# Patient Record
Sex: MALE | Race: OTHER | HISPANIC OR LATINO | URBAN - METROPOLITAN AREA
[De-identification: names, ages, dates, MRNs, and addresses within clinical notes are randomized per-mention and may not be internally consistent; named-entity substitution may affect disease eponyms.]

---

## 2023-04-24 ENCOUNTER — EMERGENCY (EMERGENCY)
Facility: HOSPITAL | Age: 38
LOS: 1 days | Discharge: HOME | End: 2023-04-24
Attending: EMERGENCY MEDICINE
Payer: COMMERCIAL

## 2023-04-24 VITALS
RESPIRATION RATE: 16 BRPM | HEART RATE: 62 BPM | TEMPERATURE: 98 F | HEIGHT: 72 IN | OXYGEN SATURATION: 99 % | SYSTOLIC BLOOD PRESSURE: 134 MMHG | WEIGHT: 205.03 LBS | DIASTOLIC BLOOD PRESSURE: 77 MMHG

## 2023-04-24 PROCEDURE — 99283 EMERGENCY DEPT VISIT LOW MDM: CPT

## 2023-04-24 PROCEDURE — 99284 EMERGENCY DEPT VISIT MOD MDM: CPT

## 2023-04-24 RX ORDER — IBUPROFEN 200 MG
1 TABLET ORAL
Qty: 30 | Refills: 0
Start: 2023-04-24

## 2023-04-24 RX ORDER — IBUPROFEN 200 MG
800 TABLET ORAL ONCE
Refills: 0 | Status: COMPLETED | OUTPATIENT
Start: 2023-04-24 | End: 2023-04-24

## 2023-04-24 RX ADMIN — Medication 800 MILLIGRAM(S): at 09:51
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
## 2023-04-24 NOTE — ED PROVIDER NOTE - NSFOLLOWUPINSTRUCTIONS_ED_ALL_ED_FT
Distensión de la austyn    CUIDADO AMBULATORIO:    Shayne distensión de la ingleocurre cuando un músculo o tendón está estirado o desgarrado. Los tendones son cordones de tejido que sujetan el músculo al hueso.    Los síntomas más comunes incluyen los siguientes:    Dolor y sensibilidad en la parte de adentro de bird muslo.    Dolor cuando usted une michelle piernas.    Dolor cuando usted levanta bird rodilla.  Llame a bird médico si:    Usted tiene más inflamación y dolor en el área lesionada.    Usted tiene más dolor de austyn al estar parado o con movimiento.    Usted tiene preguntas o inquietudes acerca de bird lesión o tratamiento.  El tratamiento para shayne distensión de la inglepodría incluir medicamentos para el dolor. Es probable que usted también necesite de un dispositivo de apoyo, ellyn muletas o un bastón, para disminuir el dolor mientras se mueve.    Cuidado de bird distensión de la austyn:    El descanso ayuda a disminuir el riesgo de más daños en la austyn.Evite actividades que causen dolor. Use las muletas o un bastón ellyn le indiquen.    Aplique hielo para ayudar a disminuir la inflamación y el dolor.Use shayne compresa de hielo o ponga hielo triturado en shayne bolsa de plástico. Cúbralo con shayne toalla antes de colocarlo sobre la austyn. Aplique hielo peri 15 a 20 minutos por hora o según indicaciones.    Cubra la austyn:Bird médico le indicará cómo envolverse la austyn con shayne venda o cinta elástica. Bird austyn se vuelve más estable cuando usted la envuelve. Envolver la austyn puede ayudar a bajar el dolor.  Cómo colocar shayne venda elástica      Eleve la pierna del lado lesionado con la frecuencia posible.Iona ayudará a disminuir la inflamación y el dolor en bird austyn. Coloque bird pierna sobre almohadas o cobijas para mantenerla elevada cómodamente.  Elevar la pierna  Actividad:Es posible que usted tenga que ejercitar el área lesionada después de que bird dolor e inflamación hayan disminuido. El ejercicio va a ayudar a prevenir rigidez en bird área lesionada y aumentar bird fuerza. Regrese a michelle actividades normales lentamente. Usted podría lesionarse otra vez si regresa a michelle actividades normales demasiado pronto. Regrese a bird nivel normal de actividad cuando:    Usted no tiene dolor cuando camina, corre o salta.    La pierna lesionada se mueve igual que la pierna no lesionada.    La pierna lesionada se siente tan natacha ellyn la pierna no lesionada.  Prevenga otra distensión de la austyn:    Entre en calor y estírese antes de hacer ejercicio.    Use zapatos que le calcen noni.    Use equipo de protección cuando practique deportes.    Christiano los ejercicios para aumentar la fuerza muscular karthikeyan ellyn se le recomienda.  Acuda a la consulta de control con bird médico según las indicaciones:Anote michelle preguntas para que se acuerde de hacerlas peri michelle visitas.

## 2023-04-24 NOTE — ED PROVIDER NOTE - CLINICAL SUMMARY MEDICAL DECISION MAKING FREE TEXT BOX
Patient with right groin strain.  Clinical exam reveals that pain is reproduced with right leg movement.  Low suspicion for blood clot or infection.  Will advise ibuprofen and PCP follow-up if symptoms persist for possible referral to PT.  No need for imaging at this time no need for lab work at this time.  Low suspicion for testicular abnormality given that testicles are nontender low suspicion for renal colic given that the localized nature of the pain.

## 2023-04-24 NOTE — ED PROVIDER NOTE - PATIENT PORTAL LINK FT
You can access the FollowMyHealth Patient Portal offered by [facility] by registering at the following website: [email or web address]. By joining [employer]’s FollowMyHealth portal, you will also be able to view your health information using other applications (apps) compatible with our system.

## 2023-04-24 NOTE — ED ADULT NURSE NOTE - NSIMPLEMENTINTERV_GEN_ALL_ED
Implemented All Universal Safety Interventions:  [place] to call system. Call bell, personal items and telephone within reach. Instruct patient to call for assistance. Room bathroom lighting operational. Non-slip footwear when patient is off stretcher. Physically safe environment: no spills, clutter or unnecessary equipment. Stretcher in lowest position, wheels locked, appropriate side rails in place.

## 2023-12-11 ENCOUNTER — APPOINTMENT (OUTPATIENT)
Dept: ORTHOPEDIC SURGERY | Facility: CLINIC | Age: 38
End: 2023-12-11
Payer: COMMERCIAL

## 2023-12-11 VITALS — BODY MASS INDEX: 29.12 KG/M2 | HEIGHT: 72 IN | WEIGHT: 215 LBS

## 2023-12-11 DIAGNOSIS — Z78.9 OTHER SPECIFIED HEALTH STATUS: ICD-10-CM

## 2023-12-11 DIAGNOSIS — S62.639A DISPLACED FRACTURE OF DISTAL PHALANX OF UNSPECIFIED FINGER, INITIAL ENCOUNTER FOR CLOSED FRACTURE: ICD-10-CM

## 2023-12-11 PROBLEM — Z00.00 ENCOUNTER FOR PREVENTIVE HEALTH EXAMINATION: Status: ACTIVE | Noted: 2023-12-11

## 2023-12-11 PROCEDURE — 99203 OFFICE O/P NEW LOW 30 MIN: CPT

## 2023-12-11 PROCEDURE — 73140 X-RAY EXAM OF FINGER(S): CPT | Mod: RT

## 2023-12-13 PROBLEM — S62.639A CLOSED FRACTURE OF TUFT OF DISTAL PHALANX OF FINGER: Status: ACTIVE | Noted: 2023-12-13

## 2023-12-13 NOTE — IMAGING
[de-identified] : Right small finger Completely avulsed nail plate, Chromic Gut sutures in place Moderately swollen distal phalanx, TTP + FDS/FDP  3 views right small finger: Acute tuft fracture

## 2023-12-13 NOTE — DISCUSSION/SUMMARY
[de-identified] : Reviewed the nature of this injury with the patient in layman's terms Discussed potential treatment options including both operative and nonoperative Reviewed wound care procedures in depth Stack splint for comfort Complete oral antibiotics Follow-up in 1 week for repeat clinical check, sooner if he has any issues

## 2023-12-13 NOTE — HISTORY OF PRESENT ILLNESS
[de-identified] : 12/11/2023 NICOLE JORDAN is here for Location: Right Small Finger  Complaint: The patient reports a crush injury to his right small finger while at work on 12/6/2023.  He notes that a panel of concrete sheet rock fell and landed on his right small fingertip.  He noted immediate pain and was seen in the ER.  He was given oral antibiotics and placed in a splint with instructions to follow-up as an outpatient.  Today patient continues to note moderate pain. Onset: 12/06/2023 Prior treatments: Splint/wound care Hand dominance: Right Occupation:

## 2023-12-13 NOTE — REASON FOR VISIT
[FreeTextEntry2] : new patient: Right Small Finger  [Interpreters_IDNumber] : [id number] [Interpreters_FullName] : Rafael  [TWNoteComboBox1] : [place]

## 2023-12-18 ENCOUNTER — APPOINTMENT (OUTPATIENT)
Dept: ORTHOPEDIC SURGERY | Facility: CLINIC | Age: 38
End: 2023-12-18

## 2024-02-16 ENCOUNTER — EMERGENCY (EMERGENCY)
Facility: HOSPITAL | Age: 39
LOS: 1 days | Discharge: HOME | End: 2024-02-16
Attending: EMERGENCY MEDICINE
Payer: COMMERCIAL

## 2024-02-16 VITALS
SYSTOLIC BLOOD PRESSURE: 133 MMHG | OXYGEN SATURATION: 95 % | DIASTOLIC BLOOD PRESSURE: 86 MMHG | TEMPERATURE: 98 F | HEART RATE: 71 BPM | RESPIRATION RATE: 18 BRPM

## 2024-02-16 VITALS
SYSTOLIC BLOOD PRESSURE: 148 MMHG | HEART RATE: 102 BPM | RESPIRATION RATE: 18 BRPM | OXYGEN SATURATION: 99 % | DIASTOLIC BLOOD PRESSURE: 79 MMHG | TEMPERATURE: 98 F | WEIGHT: 220.02 LBS

## 2024-02-16 LAB
ANION GAP SERPL CALC-SCNC: 4 MMOL/L — LOW (ref 5–17)
BASOPHILS # BLD AUTO: 0.04 K/UL — SIGNIFICANT CHANGE UP (ref 0–0.2)
BASOPHILS NFR BLD AUTO: 0.6 % — SIGNIFICANT CHANGE UP (ref 0–2)
BUN SERPL-MCNC: 14 MG/DL — SIGNIFICANT CHANGE UP (ref 7–18)
CALCIUM SERPL-MCNC: 9.5 MG/DL — SIGNIFICANT CHANGE UP (ref 8.4–10.5)
CHLORIDE SERPL-SCNC: 106 MMOL/L — SIGNIFICANT CHANGE UP (ref 96–108)
CO2 SERPL-SCNC: 29 MMOL/L — SIGNIFICANT CHANGE UP (ref 22–31)
CREAT SERPL-MCNC: 0.81 MG/DL — SIGNIFICANT CHANGE UP (ref 0.5–1.3)
EGFR: 116 ML/MIN/1.73M2 — SIGNIFICANT CHANGE UP
EOSINOPHIL # BLD AUTO: 0.21 K/UL — SIGNIFICANT CHANGE UP (ref 0–0.5)
EOSINOPHIL NFR BLD AUTO: 3.1 % — SIGNIFICANT CHANGE UP (ref 0–6)
GLUCOSE SERPL-MCNC: 106 MG/DL — HIGH (ref 70–99)
HCT VFR BLD CALC: 45.5 % — SIGNIFICANT CHANGE UP (ref 39–50)
HGB BLD-MCNC: 15.2 G/DL — SIGNIFICANT CHANGE UP (ref 13–17)
IMM GRANULOCYTES NFR BLD AUTO: 0.1 % — SIGNIFICANT CHANGE UP (ref 0–0.9)
LACTATE SERPL-SCNC: 0.8 MMOL/L — SIGNIFICANT CHANGE UP (ref 0.7–2)
LYMPHOCYTES # BLD AUTO: 2.49 K/UL — SIGNIFICANT CHANGE UP (ref 1–3.3)
LYMPHOCYTES # BLD AUTO: 36.4 % — SIGNIFICANT CHANGE UP (ref 13–44)
MCHC RBC-ENTMCNC: 28.8 PG — SIGNIFICANT CHANGE UP (ref 27–34)
MCHC RBC-ENTMCNC: 33.4 GM/DL — SIGNIFICANT CHANGE UP (ref 32–36)
MCV RBC AUTO: 86.2 FL — SIGNIFICANT CHANGE UP (ref 80–100)
MONOCYTES # BLD AUTO: 0.57 K/UL — SIGNIFICANT CHANGE UP (ref 0–0.9)
MONOCYTES NFR BLD AUTO: 8.3 % — SIGNIFICANT CHANGE UP (ref 2–14)
NEUTROPHILS # BLD AUTO: 3.52 K/UL — SIGNIFICANT CHANGE UP (ref 1.8–7.4)
NEUTROPHILS NFR BLD AUTO: 51.5 % — SIGNIFICANT CHANGE UP (ref 43–77)
NRBC # BLD: 0 /100 WBCS — SIGNIFICANT CHANGE UP (ref 0–0)
PLATELET # BLD AUTO: 324 K/UL — SIGNIFICANT CHANGE UP (ref 150–400)
POTASSIUM SERPL-MCNC: 3.7 MMOL/L — SIGNIFICANT CHANGE UP (ref 3.5–5.3)
POTASSIUM SERPL-SCNC: 3.7 MMOL/L — SIGNIFICANT CHANGE UP (ref 3.5–5.3)
RBC # BLD: 5.28 M/UL — SIGNIFICANT CHANGE UP (ref 4.2–5.8)
RBC # FLD: 12.6 % — SIGNIFICANT CHANGE UP (ref 10.3–14.5)
SODIUM SERPL-SCNC: 139 MMOL/L — SIGNIFICANT CHANGE UP (ref 135–145)
WBC # BLD: 6.84 K/UL — SIGNIFICANT CHANGE UP (ref 3.8–10.5)
WBC # FLD AUTO: 6.84 K/UL — SIGNIFICANT CHANGE UP (ref 3.8–10.5)

## 2024-02-16 PROCEDURE — 85025 COMPLETE CBC W/AUTO DIFF WBC: CPT

## 2024-02-16 PROCEDURE — 99284 EMERGENCY DEPT VISIT MOD MDM: CPT | Mod: 25

## 2024-02-16 PROCEDURE — 99285 EMERGENCY DEPT VISIT HI MDM: CPT

## 2024-02-16 PROCEDURE — 96374 THER/PROPH/DIAG INJ IV PUSH: CPT

## 2024-02-16 PROCEDURE — 80048 BASIC METABOLIC PNL TOTAL CA: CPT

## 2024-02-16 PROCEDURE — 36415 COLL VENOUS BLD VENIPUNCTURE: CPT

## 2024-02-16 PROCEDURE — 83605 ASSAY OF LACTIC ACID: CPT

## 2024-02-16 RX ORDER — MORPHINE SULFATE 50 MG/1
4 CAPSULE, EXTENDED RELEASE ORAL ONCE
Refills: 0 | Status: DISCONTINUED | OUTPATIENT
Start: 2024-02-16 | End: 2024-02-16

## 2024-02-16 RX ADMIN — MORPHINE SULFATE 4 MILLIGRAM(S): 50 CAPSULE, EXTENDED RELEASE ORAL at 04:34

## 2024-02-16 RX ADMIN — MORPHINE SULFATE 4 MILLIGRAM(S): 50 CAPSULE, EXTENDED RELEASE ORAL at 05:00

## 2024-02-16 NOTE — ED PROVIDER NOTE - OBJECTIVE STATEMENT
38-year-old  male with history of left-sided inguinal hernia repair presents with 4 years ago presents with acute onset of severe pain at his left groin.  Patient states it was a 10 out of 10 now is an 8 out of 10.

## 2024-02-16 NOTE — ED ADULT NURSE NOTE - NSFALLUNIVINTERV_ED_ALL_ED
Bed/Stretcher in lowest position, wheels locked, appropriate side rails in place/Call bell, personal items and telephone in reach/Instruct patient to call for assistance before getting out of bed/chair/stretcher/Non-slip footwear applied when patient is off stretcher/[place] to call system/Physically safe environment - no spills, clutter or unnecessary equipment/Purposeful proactive rounding/Room/bathroom lighting operational, light cord in reach

## 2024-02-16 NOTE — CONSULT NOTE ADULT - SUBJECTIVE AND OBJECTIVE BOX
38 Y M presenting to ED with intermittent left groin pain that started on Wednesday. Pt. has a significant PSH of left inguinal hernia repair 4 years prior and states that he feels a bulge there sometimes. Denies any nausea or vomiting. Having normal bowel function. Tolerating diet. Denies any fevers at home.     Vital Signs Last 24 Hrs  T(C): 36.9 (16 Feb 2024 06:30), Max: 36.9 (16 Feb 2024 06:30)  T(F): 98.4 (16 Feb 2024 06:30), Max: 98.4 (16 Feb 2024 06:30)  HR: 71 (16 Feb 2024 06:30) (71 - 102)  BP: 133/86 (16 Feb 2024 06:30) (133/86 - 148/79)  BP(mean): --  RR: 18 (16 Feb 2024 06:30) (18 - 18)  SpO2: 95% (16 Feb 2024 06:30) (95% - 99%)    Parameters below as of 16 Feb 2024 06:30  Patient On (Oxygen Delivery Method): room air  no acute distress, AAOX3   non labored breathing, saturating well on room air  abd is soft, non distended, No inguinal bulge noted, no overlying skin changes, mild TTP in left  inguinal canal, able to feel a protrusion of hernia contents on vasalva. Reducible.   full ROM x 4  skin well perfused                          15.2   6.84  )-----------( 324      ( 16 Feb 2024 04:40 )             45.5   02-16    139  |  106  |  14  ----------------------------<  106<H>  3.7   |  29  |  0.81    Ca    9.5      16 Feb 2024 04:40

## 2024-02-16 NOTE — ED PROVIDER NOTE - PHYSICAL EXAMINATION
General: Well appearing, no acute distress, appears stated age  HEENT: normocephalic, atraumatic   Respiratory: normal work of breathing  MSK: no swelling or tenderness of lower extremities, moving all extremities spontaneously   Skin: warm, dry  Neuro: A&Ox3, cranial nerves II-XII intact, 5/5 strength in all extremities, no sensory deficits, normal gait   Psych: appropriate affect  .: mild swelling mild tenderness no erythema

## 2024-02-16 NOTE — CONSULT NOTE ADULT - ASSESSMENT
38 Y M with non-incarcerated left inguinal hernia recurrence, WBC normal, lactate normal, having bowel function, and tolerating diet  -no acute surgical intervention   -f/u outpatient for repair of recurrent left inguinal hernia

## 2024-02-16 NOTE — ED PROVIDER NOTE - CLINICAL SUMMARY MEDICAL DECISION MAKING FREE TEXT BOX
Patient does not appear to be incarcerated or strangulated, surgery resident evaluated the patient as well and feels the same.  Patient is stable for discharge follow-up with his surgeon.

## 2024-02-16 NOTE — CONSULT NOTE ADULT - CONSULT REQUESTED BY NAME
ED Subjective:       Patient ID: Aguila Claros is a 56 y.o. male.    Chief Complaint: Establish Care (DR BLANC PT: EST CARE &F/U FOR HTN)    56-year-old gentleman is here for follow-up on his chronic hypertension and he does have a few other medical illnesses to include instruct her sleep apnea which recently has been treated, fatty liver for which she's been working out and losing weight as well as dyslipidemia with a positive family history for coronary artery disease. His last lab work was in April 2017. He is also complaining today about acute exacerbation of allergic rhinitis with sudden onset of an inability to breathe out of his nose with lots of mucus, itching and sneezing. He has tried everything over-the-counter to include Allegra, Zyrtec and Claritin to no avail. He does not really want to take something every day. He is out so interested in allergy testing. He was on Singulair one time in the remote past and this seemed to help and he is okay with taking this every evening. He states Flonase does not work either.  He'll subtle skin lesion he would like to have looked at by dermatologist on the back of his neck.  We did review his [facility] risk in view of his untreated high cholesterol and he sat 20% of the risk of a cardiovascular event within 10 years.      Review of Systems   Constitutional: Negative for activity change, diaphoresis, fatigue and fever.   HENT: Negative for congestion, ear pain, postnasal drip, sinus pressure, sore throat and trouble swallowing.    Eyes: Negative for pain.   Respiratory: Negative for cough, chest tightness, shortness of breath and wheezing.    Cardiovascular: Negative for chest pain, palpitations and leg swelling.   Gastrointestinal: Negative for abdominal distention, abdominal pain, blood in stool, constipation and diarrhea.   Endocrine: Negative for cold intolerance and heat intolerance.   Genitourinary: Negative for decreased urine volume, difficulty urinating,  dysuria, flank pain, frequency and hematuria.   Musculoskeletal: Negative for arthralgias, back pain, joint swelling, myalgias and neck pain.   Skin: Negative for pallor, rash and wound.   Neurological: Negative for tremors, syncope, weakness, light-headedness, numbness and headaches.   Hematological: Negative for adenopathy.   Psychiatric/Behavioral: Negative for confusion, decreased concentration, hallucinations, self-injury, sleep disturbance and suicidal ideas. The patient is not nervous/anxious.        History reviewed. No pertinent past medical history.    History reviewed. No pertinent surgical history.    History reviewed. No pertinent family history.    Social History     Social History    Marital status:      Spouse name: N/A    Number of children: N/A    Years of education: N/A     Social History Main Topics    Smoking status: Former Smoker     Packs/day: 0.75     Types: Cigarettes    Smokeless tobacco: Never Used      Comment: QUIT 3 YRS AGO    Alcohol use None      Comment: OCCASIONAL    Drug use: No    Sexual activity: Yes     Other Topics Concern    None     Social History Narrative    None       Current Outpatient Prescriptions   Medication Sig Dispense Refill    losartan-hydrochlorothiazide 100-12.5 mg (HYZAAR) 100-12.5 mg Tab Take 1 tablet by mouth once daily. 90 tablet 3    azelastine (ASTELIN) 137 mcg (0.1 %) nasal spray 1 spray (137 mcg total) by Nasal route 2 (two) times daily. 30 mL 3    levocetirizine (XYZAL) 5 MG tablet Take 1 tablet (5 mg total) by mouth daily as needed for Allergies. 30 tablet 11    montelukast (SINGULAIR) 10 mg tablet Take 1 tablet (10 mg total) by mouth every evening. 30 tablet 0     No current facility-administered medications for this visit.        Review of patient's allergies indicates:  No Known Allergies  Objective:      Physical Exam   Constitutional: He is oriented to person, place, and time. He appears well-developed and well-nourished. No  distress.   HENT:   Head: Normocephalic and atraumatic.   Right Ear: External ear normal.   Left Ear: External ear normal.   Nose: Mucosal edema and septal deviation present.   Mouth/Throat: Oropharynx is clear and moist.   Eyes: Conjunctivae and EOM are normal. Right eye exhibits no discharge. Left eye exhibits no discharge. No scleral icterus.   Neck: Normal range of motion. Neck supple. No JVD present. No tracheal deviation present. Thyromegaly present.   Cardiovascular: Normal rate, regular rhythm, normal heart sounds and intact distal pulses.  Exam reveals no gallop.    No murmur heard.  Pulmonary/Chest: Effort normal and breath sounds normal. No respiratory distress. He has no wheezes. He has no rales.   Abdominal: Soft. Bowel sounds are normal. He exhibits no distension and no mass. There is no tenderness.   Musculoskeletal: Normal range of motion. He exhibits no edema or tenderness.   Lymphadenopathy:     He has no cervical adenopathy.   Neurological: He is alert and oriented to person, place, and time.   Skin: Skin is warm and dry. No rash noted. He is not diaphoretic. No erythema.   Psychiatric: He has a normal mood and affect. His behavior is normal. Judgment and thought content normal.       Assessment:       1. Allergic rhinoconjunctivitis    2. Familial hypercholesterolemia    3. Benign hypertension    4. Essential hypertension    5. Enlarged thyroid    6. Skin lesion of neck    7. Screening for malignant neoplasm of prostate        Plan:       Allergic rhinoconjunctivitis  -     CBC auto differential; Future; Expected date: 04/09/2018  -     Ambulatory referral to Allergy   -     montelukast (SINGULAIR) 10 mg tablet; Take 1 tablet (10 mg total) by mouth every evening.  Dispense: 30 tablet; Refill: 0  -     levocetirizine (XYZAL) 5 MG tablet; Take 1 tablet (5 mg total) by mouth daily as needed for Allergies.  Dispense: 30 tablet; Refill: 11  -     azelastine (ASTELIN) 137 mcg (0.1 %) nasal spray; 1  spray (137 mcg total) by Nasal route 2 (two) times daily.  Dispense: 30 mL; Refill: 3    Familial hypercholesterolemia  -     Lipid panel; Future; Expected date: 04/09/2018    Benign hypertension  -     losartan-hydrochlorothiazide 100-12.5 mg (HYZAAR) 100-12.5 mg Tab; Take 1 tablet by mouth once daily.  Dispense: 90 tablet; Refill: 3  -     TSH; Future; Expected date: 04/09/2018  -     Comprehensive metabolic panel; Future; Expected date: 04/09/2018  -     T4, free; Future; Expected date: 04/09/2018  -     Microalbumin/creatinine urine ratio; Future; Expected date: 04/09/2018  -     Urinalysis Microscopic; Future; Expected date: 04/09/2018    Enlarged thyroid  -     US Soft Tissue Head Neck Thyroid; Future; Expected date: 01/29/2018    Skin lesion of neck  -     Ambulatory referral to Dermatology    Screening for malignant neoplasm of prostate  -     PSA, Screening; Future; Expected date: 04/09/2018    Other orders  Needs CRS screening as well     Time spent with the patient was 40 min and 50 percent of that was in face to face contact

## 2024-02-16 NOTE — ED PROVIDER NOTE - CARE PROVIDER_API CALL
Joy Arzola  Colon/Rectal Surgery  [address] [place] [address]  Phone: [phone]  Fax: [phone]  Follow Up Time:

## 2024-02-16 NOTE — ED PROVIDER NOTE - PATIENT PORTAL LINK FT
You can access the FollowMyHealth Patient Portal offered by [facility] by registering at the following website: [email or web address]. By joining [facility]’s FollowMyHealth portal, you will also be able to view your health information using other applications (apps) compatible with our system.

## 2024-11-12 PROBLEM — K82.4 GALLBLADDER POLYP: Status: ACTIVE | Noted: 2024-11-12

## 2024-11-14 ENCOUNTER — APPOINTMENT (OUTPATIENT)
Dept: SURGERY | Facility: CLINIC | Age: 39
End: 2024-11-14
Payer: COMMERCIAL

## 2024-11-14 VITALS
SYSTOLIC BLOOD PRESSURE: 122 MMHG | DIASTOLIC BLOOD PRESSURE: 77 MMHG | HEART RATE: 65 BPM | HEIGHT: 72 IN | WEIGHT: 213 LBS | BODY MASS INDEX: 28.85 KG/M2 | OXYGEN SATURATION: 99 %

## 2024-11-14 DIAGNOSIS — Z87.891 PERSONAL HISTORY OF NICOTINE DEPENDENCE: ICD-10-CM

## 2024-11-14 DIAGNOSIS — K82.4 CHOLESTEROLOSIS OF GALLBLADDER: ICD-10-CM

## 2024-11-14 DIAGNOSIS — Z78.9 OTHER SPECIFIED HEALTH STATUS: ICD-10-CM

## 2024-11-14 PROCEDURE — 99204 OFFICE O/P NEW MOD 45 MIN: CPT

## 2024-11-21 ENCOUNTER — OUTPATIENT (OUTPATIENT)
Dept: OUTPATIENT SERVICES | Facility: HOSPITAL | Age: 39
LOS: 1 days | End: 2024-11-21
Payer: COMMERCIAL

## 2024-11-21 VITALS
HEART RATE: 59 BPM | WEIGHT: 212.97 LBS | TEMPERATURE: 99 F | OXYGEN SATURATION: 100 % | HEIGHT: 72 IN | DIASTOLIC BLOOD PRESSURE: 65 MMHG | RESPIRATION RATE: 18 BRPM | SYSTOLIC BLOOD PRESSURE: 102 MMHG

## 2024-11-21 DIAGNOSIS — K81.9 CHOLECYSTITIS, UNSPECIFIED: ICD-10-CM

## 2024-11-21 DIAGNOSIS — Z01.818 ENCOUNTER FOR OTHER PREPROCEDURAL EXAMINATION: ICD-10-CM

## 2024-11-21 DIAGNOSIS — Z90.49 ACQUIRED ABSENCE OF OTHER SPECIFIED PARTS OF DIGESTIVE TRACT: Chronic | ICD-10-CM

## 2024-11-21 DIAGNOSIS — Z98.890 OTHER SPECIFIED POSTPROCEDURAL STATES: Chronic | ICD-10-CM

## 2024-11-21 LAB — BLD GP AB SCN SERPL QL: SIGNIFICANT CHANGE UP

## 2024-11-21 NOTE — H&P PST ADULT - ASSESSMENT
38 y/o Venezuelan-speaking male with no significant PMH is diagnosed with cholecystitis, unspecified.     STOP BANG SCORE IS 3

## 2024-11-21 NOTE — H&P PST ADULT - HISTORY OF PRESENT ILLNESS
40 y/o Estonian-speaking male presents to [facility] with preop diagnosis of cholecystitis unspecified. He complains of severe episodic right upper quadrant pain before meals ongoing for the past month. He reports that abdominal sonogram revealed a "mass in his gallbladder."  He is scheduled for laparoscopic cholecystectomy with intra-operative cholangiogram possible open on 11/29/2024 40 y/o Croatian-speaking male presents to [facility] with preop diagnosis of cholecystitis unspecified. He complains of severe episodic right upper quadrant pain before meals ongoing for the past month. He reports that abdominal sonogram revealed a "gallbladder polyps."  He is scheduled for laparoscopic cholecystectomy with intra-operative cholangiogram possible open on 11/29/2024

## 2024-11-21 NOTE — H&P PST ADULT - PROBLEM SELECTOR PLAN 1
Scheduled for laparoscopic cholecystectomy with intra-operative cholangiogram possible open on 11/29/2024  Preoperative instructions discussed and given to patient.   NPO between midnight and the time of surgery  Follow up with surgeon's and PCP's office for instructions prior to surgery   Instructed patient to avoid aspirin and aspirin products, over the counter medications such as vitamins and herbal medications one week prior to surgery.  Avoid tobacco use the morning of surgery  Take Tylenol as needed for pain  Follow up with PCP postoperatively for management of chronic conditions  Discussed preprocedure skin preparation using  dial soap and chlorhexidine gluconate 4% solution three days prior to  surgery - including the day of surgery  Patient verbalized understanding of instructions  Labs pending from PCP's office

## 2024-11-22 LAB
HCV AB S/CO SERPL IA: 0.07 S/CO — SIGNIFICANT CHANGE UP (ref 0–0.99)
HCV AB SERPL-IMP: SIGNIFICANT CHANGE UP

## 2024-11-22 PROCEDURE — T1013: CPT

## 2024-11-22 PROCEDURE — 36415 COLL VENOUS BLD VENIPUNCTURE: CPT

## 2024-11-22 PROCEDURE — 86901 BLOOD TYPING SEROLOGIC RH(D): CPT

## 2024-11-22 PROCEDURE — 86850 RBC ANTIBODY SCREEN: CPT

## 2024-11-22 PROCEDURE — 86803 HEPATITIS C AB TEST: CPT

## 2024-11-22 PROCEDURE — G0463: CPT

## 2024-11-22 PROCEDURE — 86900 BLOOD TYPING SEROLOGIC ABO: CPT

## 2024-11-29 ENCOUNTER — APPOINTMENT (OUTPATIENT)
Dept: SURGERY | Facility: HOSPITAL | Age: 39
End: 2024-11-29

## 2024-11-29 ENCOUNTER — OUTPATIENT (OUTPATIENT)
Dept: OUTPATIENT SERVICES | Facility: HOSPITAL | Age: 39
LOS: 1 days | End: 2024-11-29
Payer: COMMERCIAL

## 2024-11-29 ENCOUNTER — TRANSCRIPTION ENCOUNTER (OUTPATIENT)
Age: 39
End: 2024-11-29

## 2024-11-29 VITALS
SYSTOLIC BLOOD PRESSURE: 130 MMHG | HEIGHT: 72 IN | DIASTOLIC BLOOD PRESSURE: 65 MMHG | WEIGHT: 212.97 LBS | RESPIRATION RATE: 16 BRPM | TEMPERATURE: 98 F | OXYGEN SATURATION: 96 % | HEART RATE: 63 BPM

## 2024-11-29 VITALS
DIASTOLIC BLOOD PRESSURE: 76 MMHG | OXYGEN SATURATION: 96 % | RESPIRATION RATE: 18 BRPM | TEMPERATURE: 98 F | SYSTOLIC BLOOD PRESSURE: 126 MMHG | HEART RATE: 63 BPM

## 2024-11-29 DIAGNOSIS — Z98.890 OTHER SPECIFIED POSTPROCEDURAL STATES: Chronic | ICD-10-CM

## 2024-11-29 DIAGNOSIS — K81.9 CHOLECYSTITIS, UNSPECIFIED: ICD-10-CM

## 2024-11-29 DIAGNOSIS — Z90.49 ACQUIRED ABSENCE OF OTHER SPECIFIED PARTS OF DIGESTIVE TRACT: Chronic | ICD-10-CM

## 2024-11-29 DIAGNOSIS — Z01.818 ENCOUNTER FOR OTHER PREPROCEDURAL EXAMINATION: ICD-10-CM

## 2024-11-29 LAB — BLD GP AB SCN SERPL QL: SIGNIFICANT CHANGE UP

## 2024-11-29 PROCEDURE — 86850 RBC ANTIBODY SCREEN: CPT

## 2024-11-29 PROCEDURE — 76000 FLUOROSCOPY <1 HR PHYS/QHP: CPT

## 2024-11-29 PROCEDURE — 88304 TISSUE EXAM BY PATHOLOGIST: CPT

## 2024-11-29 PROCEDURE — 86900 BLOOD TYPING SEROLOGIC ABO: CPT

## 2024-11-29 PROCEDURE — 47563 LAPARO CHOLECYSTECTOMY/GRAPH: CPT | Mod: AS

## 2024-11-29 PROCEDURE — 47563 LAPARO CHOLECYSTECTOMY/GRAPH: CPT

## 2024-11-29 PROCEDURE — 36415 COLL VENOUS BLD VENIPUNCTURE: CPT

## 2024-11-29 PROCEDURE — 86901 BLOOD TYPING SEROLOGIC RH(D): CPT

## 2024-11-29 PROCEDURE — 88304 TISSUE EXAM BY PATHOLOGIST: CPT | Mod: 26

## 2024-11-29 RX ORDER — 0.9 % SODIUM CHLORIDE 0.9 %
1000 INTRAVENOUS SOLUTION INTRAVENOUS
Refills: 0 | Status: DISCONTINUED | OUTPATIENT
Start: 2024-11-29 | End: 2024-11-29

## 2024-11-29 RX ORDER — OXYCODONE HYDROCHLORIDE 30 MG/1
1 TABLET ORAL
Qty: 8 | Refills: 0
Start: 2024-11-29 | End: 2024-11-30

## 2024-11-29 RX ORDER — FENTANYL 12 UG/H
25 PATCH, EXTENDED RELEASE TRANSDERMAL
Refills: 0 | Status: DISCONTINUED | OUTPATIENT
Start: 2024-11-29 | End: 2024-11-29

## 2024-11-29 RX ORDER — FENTANYL 12 UG/H
50 PATCH, EXTENDED RELEASE TRANSDERMAL
Refills: 0 | Status: DISCONTINUED | OUTPATIENT
Start: 2024-11-29 | End: 2024-11-29

## 2024-11-29 RX ADMIN — FENTANYL 50 MICROGRAM(S): 12 PATCH, EXTENDED RELEASE TRANSDERMAL at 10:09

## 2024-11-29 NOTE — ASU DISCHARGE PLAN (ADULT/PEDIATRIC) - ASU DC SPECIAL INSTRUCTIONSFT
Please follow-up with your surgeon in 1 week. Drink plenty of fluids and rest as needed. Call for any fever over 101, nausea, vomiting, severe pain, no passing of gas or bowel movement.     DIET   You may resume your regular diet as normal.     SURGICAL SITES   Remove outer dressing and keep white steri-strips in place allowing them to fall off on their own. You may shower 48 hours post-operatively but do not bathe or soak in the water for 1-2 weeks; pat dry. If you notice any signs of surgical site infection (ie. redness, swelling, pain, pus drainage), please seek medical care immediately.    ACTIVITY Do not lift anything heavier than 10 pounds for 2 weeks (2-3 months for hernia, no exercise for 2 weeks) and avoid strenuous activity for 4-6 weeks.     PAIN CONTROL   You may take Motrin 600mg (with food) or Tylenol 650mg as needed for mild pain. Stagger one medication 3 hours after the other for maximum pain control. Maximum daily dose of Tylenol should not exceed 4grams/day.  You may take your prescribed oxycodone for severe breakthrough pain not that is not relieved by Tylenol/Motrin. Do not drive or make important decisions while taking this medication and do not take more than 4 pills in 24 hours. Please refer to medical records/ progress notes for in depth hospital course. Discharge plan discussed and agreed with attending.

## 2024-11-29 NOTE — ASU DISCHARGE PLAN (ADULT/PEDIATRIC) - CARE PROVIDER_API CALL
Ankit Marte  Surgery  [address]  [place] [address]  Phone: [phone]  Fax: [phone]  Follow Up Time: 2 weeks

## 2024-11-29 NOTE — ASU DISCHARGE PLAN (ADULT/PEDIATRIC) - FINANCIAL ASSISTANCE
[facility] provides services at a reduced cost to those who are determined to be eligible through [facility]’s financial assistance program. Information regarding [facility]’s financial assistance program can be found by going to [email or web address] or by calling [phone].

## 2024-12-11 LAB — SURGICAL PATHOLOGY STUDY: SIGNIFICANT CHANGE UP

## 2025-01-04 ENCOUNTER — EMERGENCY (EMERGENCY)
Facility: HOSPITAL | Age: 40
LOS: 1 days | Discharge: HOME | End: 2025-01-04
Attending: STUDENT IN AN ORGANIZED HEALTH CARE EDUCATION/TRAINING PROGRAM
Payer: COMMERCIAL

## 2025-01-04 VITALS
RESPIRATION RATE: 20 BRPM | DIASTOLIC BLOOD PRESSURE: 72 MMHG | SYSTOLIC BLOOD PRESSURE: 121 MMHG | WEIGHT: 184.97 LBS | OXYGEN SATURATION: 99 % | TEMPERATURE: 98 F | HEIGHT: 68 IN | HEART RATE: 82 BPM

## 2025-01-04 DIAGNOSIS — Z98.890 OTHER SPECIFIED POSTPROCEDURAL STATES: Chronic | ICD-10-CM

## 2025-01-04 DIAGNOSIS — Z90.49 ACQUIRED ABSENCE OF OTHER SPECIFIED PARTS OF DIGESTIVE TRACT: Chronic | ICD-10-CM

## 2025-01-04 PROCEDURE — 99283 EMERGENCY DEPT VISIT LOW MDM: CPT

## 2025-01-04 PROCEDURE — 99053 MED SERV 10PM-8AM 24 HR FAC: CPT

## 2025-01-05 VITALS
OXYGEN SATURATION: 97 % | SYSTOLIC BLOOD PRESSURE: 118 MMHG | DIASTOLIC BLOOD PRESSURE: 80 MMHG | TEMPERATURE: 98 F | HEART RATE: 77 BPM | RESPIRATION RATE: 18 BRPM

## 2025-01-05 PROCEDURE — 99285 EMERGENCY DEPT VISIT HI MDM: CPT

## 2025-01-05 PROCEDURE — T1013: CPT

## 2025-03-24 NOTE — ASU PATIENT PROFILE, ADULT - HAS THE PATIENT USED TOBACCO IN THE PAST 30 DAYS?
No care due was identified.  Health [facility] Embedded Care Due Messages. Reference number: [id number].   3/24/2025 9:55:36 AM CDT  
Please see the attached refill request.  
Yes

## 2025-05-30 NOTE — ED ADULT TRIAGE NOTE - AS TEMP SITE
[Right] : right knee [FreeTextEntry9] : 4 views (AP, Lateral, [place] and [place]) obtained and interpreted on 05/30/2025. There are no fractures, subluxations or dislocations. No significant abnormalities are seen. oral

## (undated) DEVICE — BLADE SURGICAL #10 CARBON

## (undated) DEVICE — GLV 7.5 PROTEXIS (WHITE)

## (undated) DEVICE — INSUFFLATION NDL COVIDIEN SURGINEEDLE VERESS 120MM

## (undated) DEVICE — TUBING STRYKEFLOW II SUCTION / IRRIGATOR

## (undated) DEVICE — ELCTR FOOT CONTROL L WIRE LAPAROSCOPIC

## (undated) DEVICE — BLADE SURGICAL #15 CARBON

## (undated) DEVICE — SOL INJ NS 0.9% 1000ML

## (undated) DEVICE — SUCTION YANKAUER NO CONTROL VENT

## (undated) DEVICE — GLV 7 PROTEXIS (WHITE)

## (undated) DEVICE — D HELP - CLEARVIEW CLEARIFY SYSTEM

## (undated) DEVICE — NDL HYPO REGULAR BEVEL 25G X 1.5" (BLUE)

## (undated) DEVICE — ELCTR GROUNDING PAD ADULT COVIDIEN

## (undated) DEVICE — PRECISION CUT SCISSOR MICROLINE MINI ENDOCUT DISP

## (undated) DEVICE — SUT POLYSORB 0 30" GU-46

## (undated) DEVICE — DRSG TEGADERM 2.5 X 3"

## (undated) DEVICE — WARMING BLANKET UPPER ADULT

## (undated) DEVICE — DRAPE C ARM UNIVERSAL

## (undated) DEVICE — DRSG 2X2

## (undated) DEVICE — ELCTR BOVIE BLADE 3/4" EXTENDED LENGTH 6"

## (undated) DEVICE — PACK GENERAL LAPAROSCOPY

## (undated) DEVICE — SUT POLYSORB 4-0 27" P-12 UNDYED

## (undated) DEVICE — SUT MAXON 1 60" T-60

## (undated) DEVICE — DRSG MASTISOL

## (undated) DEVICE — DRSG BANDAID 0.75X3"

## (undated) DEVICE — BASIN SET SINGLE

## (undated) DEVICE — SYR ASEPTO

## (undated) DEVICE — ENDOCATCH 10MM SPECIMEN POUCH

## (undated) DEVICE — TROCAR COVIDIEN VERSAONE BLADED SMOOTH 5MM STANDARD

## (undated) DEVICE — VENODYNE/SCD SLEEVE CALF MEDIUM

## (undated) DEVICE — TROCAR COVIDIEN VERSAONE BLADED SMOOTH 11MM STANDARD

## (undated) DEVICE — DRSG STERISTRIPS 0.5 X 4"

## (undated) DEVICE — SOL IRR POUR NS 0.9% 1500ML

## (undated) DEVICE — TUBING STRYKER PNEUMOSURE HEATED RTP

## (undated) DEVICE — SYR LUER LOK 10CC

## (undated) DEVICE — DRAPE LIGHT HANDLE COVER (BLUE)